# Patient Record
Sex: FEMALE | Race: WHITE
[De-identification: names, ages, dates, MRNs, and addresses within clinical notes are randomized per-mention and may not be internally consistent; named-entity substitution may affect disease eponyms.]

---

## 2018-05-04 ENCOUNTER — HOSPITAL ENCOUNTER (OUTPATIENT)
Dept: HOSPITAL 89 - LAB | Age: 73
End: 2018-05-04
Attending: FAMILY MEDICINE
Payer: MEDICARE

## 2018-05-04 DIAGNOSIS — E21.3: Primary | ICD-10-CM

## 2018-05-04 PROCEDURE — 36415 COLL VENOUS BLD VENIPUNCTURE: CPT

## 2018-05-04 PROCEDURE — 82570 ASSAY OF URINE CREATININE: CPT

## 2018-05-04 PROCEDURE — 82565 ASSAY OF CREATININE: CPT

## 2018-05-04 PROCEDURE — 82947 ASSAY GLUCOSE BLOOD QUANT: CPT

## 2018-05-04 PROCEDURE — 82340 ASSAY OF CALCIUM IN URINE: CPT

## 2018-05-04 PROCEDURE — 84295 ASSAY OF SERUM SODIUM: CPT

## 2018-05-04 PROCEDURE — 82374 ASSAY BLOOD CARBON DIOXIDE: CPT

## 2018-05-04 PROCEDURE — 82435 ASSAY OF BLOOD CHLORIDE: CPT

## 2018-05-04 PROCEDURE — 84132 ASSAY OF SERUM POTASSIUM: CPT

## 2018-05-04 PROCEDURE — 84520 ASSAY OF UREA NITROGEN: CPT

## 2018-05-04 PROCEDURE — 82310 ASSAY OF CALCIUM: CPT

## 2018-09-19 ENCOUNTER — HOSPITAL ENCOUNTER (OUTPATIENT)
Dept: HOSPITAL 89 - MAMO | Age: 73
End: 2018-09-19
Attending: FAMILY MEDICINE
Payer: MEDICARE

## 2018-09-19 DIAGNOSIS — Z12.31: Primary | ICD-10-CM

## 2018-09-19 PROCEDURE — 77063 BREAST TOMOSYNTHESIS BI: CPT

## 2018-09-19 PROCEDURE — 77067 SCR MAMMO BI INCL CAD: CPT

## 2018-09-19 NOTE — RADIOLOGY IMAGING REPORT
FACILITY: [facility] 

 

PATIENT NAME: CECY GAN

: [id number]

MR: [id number]

V: [id number]

EXAM DATE: [id number]

ORDERING PHYSICIAN: RAJWINDER SINGH

TECHNOLOGIST: Martina Scott

 

PROCEDURE:BILATERAL DIGITAL SCREENING MAMMOGRAM WITH CAD ASSISTED 

INTERPRETATION & 3D TOMOSYNTHESIS 

 

COMPARISON:Prior mammograms 17, 11/4/15, 14, 13, 

12.

 

INDICATIONS:SCREENING

 

FINDINGS:  

Small to moderate amount of fibroglandular tissue is seen throughout 

the breasts. The parenchymal pattern has remained stable allowing for 

difference in mammographic technique & patient positioning. There is 

no evidence of malignant appearing mass, malignant appearing 

calcifications or other secondary sign of malignancy in either breast.

 

DIAGNOSTIC CATEGORY 1--NEGATIVE.  

 

RECOMMENDATIONS:

ROUTINE MAMMOGRAM AND CLINICAL EVALUATION.   

 

IMPRESSION:

BIRADS 1: Negative. 

No significant abnormality is seen.

 

 

 

 

 

 

 

 

 

Dictated by:  Marija Odonnell M.D. on 2018 at 16:14   

Transcribed by: FIX on 2018 at 16:22    

Approved by:  Marija Odonnell M.D. on 2018 at 17:34   

[facility]

## 2018-12-17 ENCOUNTER — HOSPITAL ENCOUNTER (OUTPATIENT)
Dept: HOSPITAL 89 - LAB | Age: 73
End: 2018-12-17
Attending: FAMILY MEDICINE
Payer: MEDICARE

## 2018-12-17 DIAGNOSIS — E83.52: Primary | ICD-10-CM

## 2018-12-17 DIAGNOSIS — E34.1: ICD-10-CM

## 2018-12-17 LAB — PLATELET COUNT, AUTOMATED: 222 K/UL (ref 150–450)

## 2018-12-17 PROCEDURE — 84075 ASSAY ALKALINE PHOSPHATASE: CPT

## 2018-12-17 PROCEDURE — 82374 ASSAY BLOOD CARBON DIOXIDE: CPT

## 2018-12-17 PROCEDURE — 84155 ASSAY OF PROTEIN SERUM: CPT

## 2018-12-17 PROCEDURE — 82247 BILIRUBIN TOTAL: CPT

## 2018-12-17 PROCEDURE — 84520 ASSAY OF UREA NITROGEN: CPT

## 2018-12-17 PROCEDURE — 84460 ALANINE AMINO (ALT) (SGPT): CPT

## 2018-12-17 PROCEDURE — 36415 COLL VENOUS BLD VENIPUNCTURE: CPT

## 2018-12-17 PROCEDURE — 82947 ASSAY GLUCOSE BLOOD QUANT: CPT

## 2018-12-17 PROCEDURE — 82310 ASSAY OF CALCIUM: CPT

## 2018-12-17 PROCEDURE — 84295 ASSAY OF SERUM SODIUM: CPT

## 2018-12-17 PROCEDURE — 85025 COMPLETE CBC W/AUTO DIFF WBC: CPT

## 2018-12-17 PROCEDURE — 84450 TRANSFERASE (AST) (SGOT): CPT

## 2018-12-17 PROCEDURE — 83970 ASSAY OF PARATHORMONE: CPT

## 2018-12-17 PROCEDURE — 82565 ASSAY OF CREATININE: CPT

## 2018-12-17 PROCEDURE — 82040 ASSAY OF SERUM ALBUMIN: CPT

## 2018-12-17 PROCEDURE — 82435 ASSAY OF BLOOD CHLORIDE: CPT

## 2018-12-17 PROCEDURE — 84132 ASSAY OF SERUM POTASSIUM: CPT
